# Patient Record
Sex: FEMALE | Race: WHITE | Employment: STUDENT | URBAN - METROPOLITAN AREA
[De-identification: names, ages, dates, MRNs, and addresses within clinical notes are randomized per-mention and may not be internally consistent; named-entity substitution may affect disease eponyms.]

---
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
## 2024-12-10 ENCOUNTER — HOSPITAL ENCOUNTER (EMERGENCY)
Facility: CLINIC | Age: 19
Discharge: HOME | End: 2024-12-10
Attending: PHYSICIAN ASSISTANT | Admitting: PHYSICIAN ASSISTANT
Payer: COMMERCIAL

## 2024-12-10 VITALS
TEMPERATURE: 97.5 F | WEIGHT: 215 LBS | OXYGEN SATURATION: 96 % | BODY MASS INDEX: 36.7 KG/M2 | SYSTOLIC BLOOD PRESSURE: 137 MMHG | HEIGHT: 64 IN | RESPIRATION RATE: 16 BRPM | DIASTOLIC BLOOD PRESSURE: 85 MMHG | HEART RATE: 66 BPM

## 2024-12-10 DIAGNOSIS — Z32.01 PREGNANCY TEST POSITIVE: ICD-10-CM

## 2024-12-10 DIAGNOSIS — L05.01 PILONIDAL ABSCESS: ICD-10-CM

## 2024-12-10 LAB — HCG UR QL: POSITIVE

## 2024-12-10 PROCEDURE — 250N000009 HC RX 250: Performed by: PHYSICIAN ASSISTANT

## 2024-12-10 PROCEDURE — 81025 URINE PREGNANCY TEST: CPT | Performed by: PHYSICIAN ASSISTANT

## 2024-12-10 PROCEDURE — 81025 URINE PREGNANCY TEST: CPT | Performed by: EMERGENCY MEDICINE

## 2024-12-10 PROCEDURE — 99283 EMERGENCY DEPT VISIT LOW MDM: CPT | Mod: 25

## 2024-12-10 PROCEDURE — 10080 I&D PILONIDAL CYST SIMPLE: CPT

## 2024-12-10 RX ADMIN — LIDOCAINE HYDROCHLORIDE 5 ML: 10 INJECTION, SOLUTION EPIDURAL; INFILTRATION; INTRACAUDAL; PERINEURAL at 16:02

## 2024-12-10 RX ADMIN — Medication: at 16:02

## 2024-12-10 ASSESSMENT — ACTIVITIES OF DAILY LIVING (ADL)
ADLS_ACUITY_SCORE: 41

## 2024-12-10 ASSESSMENT — COLUMBIA-SUICIDE SEVERITY RATING SCALE - C-SSRS
1. IN THE PAST MONTH, HAVE YOU WISHED YOU WERE DEAD OR WISHED YOU COULD GO TO SLEEP AND NOT WAKE UP?: NO
2. HAVE YOU ACTUALLY HAD ANY THOUGHTS OF KILLING YOURSELF IN THE PAST MONTH?: NO
6. HAVE YOU EVER DONE ANYTHING, STARTED TO DO ANYTHING, OR PREPARED TO DO ANYTHING TO END YOUR LIFE?: NO

## 2024-12-10 NOTE — ED PROVIDER NOTES
Emergency Department Note      History of Present Illness     Chief Complaint   Alejandra Grigsby is a 19 year old female who presents to the ED for evaluation of coccygeal pain as well as abdominal cramping.  Patient states she has a history of pilonidal cysts and believes she has recurrence at this time.  She notes pain started a few weeks ago, however spontaneously improved about a week ago but has since then returned.  She has not had this current episode intervened upon.  She states she was supposed to have surgery on this in the past however has not gone forward with that to date.  Patient additionally states that she had a positive pregnancy test 2 days ago at home.  Last menses was mid October. She is having some abdominal cramping.  No vaginal bleeding.  No fevers or chills.  No issues with stooling.    Independent Historian   None    Review of External Notes   Gen surg visit on 3/20/24 for pilonidal cyst  A/P: Pilonidal cyst with recurrent abscesses. We discussed the disease and the natural course. We discussed how the theory is that the hair in the gluteal cleft curls around and acts as a drill on the skin and subcutaneous tissues (not an in-grown hair) and creates the cystic cavity which then can become infected. It is not too uncommon to find wads of hair in the cavity. We discussed the surgical treatment with marsupialization. We discussed the risks, benefits and alternatives to the procedure. Pt understood the discussion and asked good questions. We talked about the procedure and the wound care after with wound packing and sits baths BID. We also discussed the prolonged wound healing, wound healing can take up to 4 weeks to 4 months, and we talked about the pain after surgery. I stressed to the patient and her mother who was with her the importance of wound care afterwards. They have to be aggressive with wound care packing at least twice a day and after bowel movements if possible. It  "is the wound care after surgery that dictates the success of the surgery. Answered patients questions. Pt is interested in the surgery.     Because of the inflammation going on at this time, would recommend waiting until the infection has cleared and patient was back to her original baseline level of health and then can schedule surgery, doing this can mean a smaller wound when it comes to the wound care. Stressed the hair removal piece of wound healing and prevention of recurrence.     Past Medical History     Medical History and Problem List   No past medical history on file.    Medications   ALTAVERA 0.15-30 MG-MCG tablet  ondansetron (ZOFRAN-ODT) 4 MG ODT tab  tretinoin (RETIN-A) 0.1 % external cream        Surgical History   No past surgical history on file.    Physical Exam     Patient Vitals for the past 24 hrs:   BP Temp Pulse Resp SpO2 Height Weight   12/10/24 1136 (!) 147/73 -- -- -- -- -- --   12/10/24 1133 -- 97.5  F (36.4  C) 66 16 97 % 1.626 m (5' 4\") 97.5 kg (215 lb)     Physical Exam  Constitutional: Pleasant. Cooperative.   Eyes: Pupils equally round and reactive  HENT: Head is normal in appearance. Oropharynx is normal with moist mucus membranes.  Cardiovascular: Regular rate and rhythm and without murmurs.  Respiratory: Normal respiratory effort, lungs are clear bilaterally.  GI: Abdomen is soft, non-tender, non-distended. No guarding, rebound, or rigidity.  Musculoskeletal: No asymmetry of the lower extremities  Skin: Normal, without rash.  Neurologic: Cranial nerves grossly intact, normal cognition, no focal deficits. Alert and oriented x 3.   Psychiatric: Normal affect.  Nursing notes and vital signs reviewed.      Diagnostics     Lab Results   Labs Ordered and Resulted from Time of ED Arrival to Time of ED Departure   HCG QUALITATIVE URINE - Abnormal       Result Value    hCG Urine Qualitative Positive (*)        Imaging   No orders to display     Independent Interpretation   None    ED Course "      Medications Administered   Medications   lidocaine 1 % 5 mL (has no administration in time range)   lido-EPINEPHrine-tetracaine (LET) topical gel GEL ( Topical $Given by Other 12/10/24 9538)       Procedures   [facility]    PROCEDURE: -Incision/Drainage    Date/Time: 12/10/2024 4:02 PM    Performed by: Paul Campbell PA-C  Authorized by: Paul Campbell PA-C    Risks, benefits and alternatives discussed.      LOCATION:      Type:  Pilonidal cyst    PROCEDURE TYPE:     Complexity:  Simple    ANESTHESIA (see MAR for exact dosages):     Anesthesia method:  Topical application and local infiltration    Topical anesthetic:  LET    Local anesthetic:  Lidocaine 1% w/o epi    PROCEDURE DETAILS:     Incision types:  Single straight    Scalpel blade:  11    Wound management:  Probed and deloculated    Drainage:  Purulent    Drainage amount:  Copious    Wound treatment:  Wound left open    Packing materials:  None    PROCEDURE    Patient Tolerance:  Patient tolerated the procedure well with no immediate complications       Discussion of Management   None    ED Course        Additional Documentation  None    Medical Decision Making / Diagnosis     CMS Diagnoses: None    MIPS       None    MDM   Gary Grigsby is a 19 year old female who presents to the ED for evaluation of coccydynia.  She also notes that she had a positive pregnancy test 2 days ago.  She does have some mild abdominal cramping, however no vaginal bleeding.  See HPI as above for additional details.  Vitals and physical exam as above.  Patient has evidence for pilonidal cyst/abscess.  I&D performed as above with improvement of symptoms and copious output.  No complications, wound was bandaged.  Advise that she follow-up with general surgery as she has previously seen general surgery and was advised to have this removed however did not go forward with this.  She can have that discussion at that time with a general surgeon.  Patient also  states that she had a positive pregnancy test recently with some abdominal cramping.  OB ultrasound ordered, however ultimately patient declines ultrasound stating that it was taking too long.  No vaginal bleeding.  Discussed with patient to have very low threshold to return to the ED with any worsening abdominal pain, should she start to have vaginal bleeding, or with any concerns over her pilonidal cyst. No surrounding cellulitis to suggest for need for oral abx.  Do feel patient safe for discharge to home. Discussed reasons to return. All questions answered. Patient discharged to home in stable condition.    Disposition   The patient was discharged.     Diagnosis     ICD-10-CM    1. Pilonidal abscess  L05.01       2. Pregnancy test positive  Z32.01            Discharge Medications   New Prescriptions    No medications on file       This record was created at least in part using electronic voice recognition software, so please excuse any typographical errors.         Paul Campbell PA-C  12/10/24 [id number]

## 2024-12-10 NOTE — ED TRIAGE NOTES
Pt presents for complaint of pilonidal cyst that started yesterday. Pt states hx of same for which she has followed up with surgery. Pt states common reoccurrence. Additionally pt states concern for pregnancy. Believes she is 6 - 8 weeks along and had a positive home pregnancy test. ABC Intact. A&Ox4.     Triage Assessment (Adult)       Row Name 12/10/24 1133          Triage Assessment    Airway WDL WDL        Respiratory WDL    Respiratory WDL WDL        Skin Circulation/Temperature WDL    Skin Circulation/Temperature WDL WDL        Cardiac WDL    Cardiac WDL WDL        Peripheral/Neurovascular WDL    Peripheral Neurovascular WDL WDL        Cognitive/Neuro/Behavioral WDL    Cognitive/Neuro/Behavioral WDL WDL

## 2024-12-10 NOTE — DISCHARGE INSTRUCTIONS
Keep wound area clean. You may wear a bandage while the wound is draining. Follow up with general surgery to discuss permanent solution. Use Tylenol 1000mg every 8 hours for pain. No ibuprofen use in pregnancy.    You have elected to defer on any further work up with respect to abdominal cramping in pregnancy. Return with severe worsening or vaginal bleeding.

## 2025-03-07 ENCOUNTER — HOSPITAL ENCOUNTER (OUTPATIENT)
Facility: CLINIC | Age: 20
End: 2025-03-07
Admitting: OBSTETRICS & GYNECOLOGY
Payer: COMMERCIAL

## 2025-03-07 ENCOUNTER — HOSPITAL ENCOUNTER (OUTPATIENT)
Facility: CLINIC | Age: 20
Discharge: HOME | End: 2025-03-07
Attending: OBSTETRICS & GYNECOLOGY | Admitting: OBSTETRICS & GYNECOLOGY
Payer: COMMERCIAL

## 2025-03-07 ENCOUNTER — HOSPITAL ENCOUNTER (EMERGENCY)
Facility: CLINIC | Age: 20
Discharge: HOME | End: 2025-03-07
Attending: EMERGENCY MEDICINE | Admitting: EMERGENCY MEDICINE
Payer: COMMERCIAL

## 2025-03-07 ENCOUNTER — APPOINTMENT (OUTPATIENT)
Dept: ULTRASOUND IMAGING | Facility: CLINIC | Age: 20
End: 2025-03-07
Attending: OBSTETRICS & GYNECOLOGY
Payer: COMMERCIAL

## 2025-03-07 VITALS
RESPIRATION RATE: 18 BRPM | HEIGHT: 64 IN | BODY MASS INDEX: 36.02 KG/M2 | TEMPERATURE: 97.8 F | DIASTOLIC BLOOD PRESSURE: 57 MMHG | SYSTOLIC BLOOD PRESSURE: 120 MMHG | WEIGHT: 211 LBS | OXYGEN SATURATION: 99 %

## 2025-03-07 VITALS
OXYGEN SATURATION: 100 % | RESPIRATION RATE: 20 BRPM | DIASTOLIC BLOOD PRESSURE: 74 MMHG | SYSTOLIC BLOOD PRESSURE: 123 MMHG | TEMPERATURE: 97.9 F | HEART RATE: 77 BPM

## 2025-03-07 DIAGNOSIS — O46.90 VAGINAL BLEEDING IN PREGNANCY: ICD-10-CM

## 2025-03-07 LAB
APTT PPP: 29 SECONDS (ref 22–38)
ERYTHROCYTE [DISTWIDTH] IN BLOOD BY AUTOMATED COUNT: 12.3 % (ref 10–15)
FIBRINOGEN PPP-MCNC: 419 MG/DL (ref 170–510)
HCT VFR BLD AUTO: 34.5 % (ref 35–47)
HGB BLD-MCNC: 11.6 G/DL (ref 11.7–15.7)
INR PPP: 1.06 (ref 0.85–1.15)
MCH RBC QN AUTO: 29.3 PG (ref 26.5–33)
MCHC RBC AUTO-ENTMCNC: 33.6 G/DL (ref 31.5–36.5)
MCV RBC AUTO: 87 FL (ref 78–100)
PLATELET # BLD AUTO: 260 10E3/UL (ref 150–450)
RBC # BLD AUTO: 3.96 10E6/UL (ref 3.8–5.2)
WBC # BLD AUTO: 12.5 10E3/UL (ref 4–11)

## 2025-03-07 PROCEDURE — G0463 HOSPITAL OUTPT CLINIC VISIT: HCPCS

## 2025-03-07 PROCEDURE — 85027 COMPLETE CBC AUTOMATED: CPT | Performed by: OBSTETRICS & GYNECOLOGY

## 2025-03-07 PROCEDURE — 85730 THROMBOPLASTIN TIME PARTIAL: CPT | Performed by: OBSTETRICS & GYNECOLOGY

## 2025-03-07 PROCEDURE — 85610 PROTHROMBIN TIME: CPT | Performed by: OBSTETRICS & GYNECOLOGY

## 2025-03-07 PROCEDURE — 36415 COLL VENOUS BLD VENIPUNCTURE: CPT | Performed by: OBSTETRICS & GYNECOLOGY

## 2025-03-07 PROCEDURE — 99281 EMR DPT VST MAYX REQ PHY/QHP: CPT

## 2025-03-07 PROCEDURE — 76805 OB US >/= 14 WKS SNGL FETUS: CPT

## 2025-03-07 PROCEDURE — 85384 FIBRINOGEN ACTIVITY: CPT | Performed by: OBSTETRICS & GYNECOLOGY

## 2025-03-07 RX ORDER — ASPIRIN 81 MG/1
81 TABLET, CHEWABLE ORAL DAILY
COMMUNITY

## 2025-03-07 ASSESSMENT — COLUMBIA-SUICIDE SEVERITY RATING SCALE - C-SSRS
6. HAVE YOU EVER DONE ANYTHING, STARTED TO DO ANYTHING, OR PREPARED TO DO ANYTHING TO END YOUR LIFE?: NO
1. IN THE PAST MONTH, HAVE YOU WISHED YOU WERE DEAD OR WISHED YOU COULD GO TO SLEEP AND NOT WAKE UP?: NO
2. HAVE YOU ACTUALLY HAD ANY THOUGHTS OF KILLING YOURSELF IN THE PAST MONTH?: NO

## 2025-03-07 ASSESSMENT — ACTIVITIES OF DAILY LIVING (ADL)
ADLS_ACUITY_SCORE: 41
ADLS_ACUITY_SCORE: 41
ADLS_ACUITY_SCORE: 18

## 2025-03-07 NOTE — ED PROVIDER NOTES
Emergency Department Note      History of Present Illness     Chief Complaint   Vaginal Bleeding - Pregnant      HPI   Gary Grigsby is a 19 year old female  who is 19 weeks and 1 day pregnant who arrives to the emergency department due to vaginal bleeding.  The patient reports that she was at work and wiped after going to the bathroom to urinate when she noticed a small amount of vaginal bleeding.  Denies any dysuria or visible blood in her urine, but states that she has had urinary frequency due to the pregnancy.  She denies any abdominal pain or pelvic cramping.  She denies back pain, nausea or vomiting.  Denies fevers or chills.  Her OB provider Karma Angelo through [facility].    Independent Historian   None    Review of External Notes   I reviewed her OB ultrasound results from 2025    Past Medical History     Medical History and Problem List   None    Medications   ALTAVERA 0.15-30 MG-MCG tablet  ondansetron (ZOFRAN-ODT) 4 MG ODT tab  tretinoin (RETIN-A) 0.1 % external cream        Surgical History   No past surgical history on file.    Physical Exam     Patient Vitals for the past 24 hrs:   BP Temp Temp src Pulse Resp SpO2   25 1458 123/74 97.9  F (36.6  C) Oral 77 20 100 %     Physical Exam  Nursing note and vitals reviewed.  Constitutional:  Appears well-developed and well-nourished.   HENT:   Head:    Atraumatic.   Mouth/Throat:   Oropharynx is clear and moist. No oropharyngeal exudate.   Eyes:    Pupils are equal, round, and reactive to light.   Neck:    Normal range of motion. Neck supple.      No tracheal deviation present. No thyromegaly present.   Cardiovascular:  Normal rate, regular rhythm, no murmur   Pulmonary/Chest: Breath sounds are clear and equal without wheezes or crackles.  Abdominal:   Soft. Bowel sounds are normal. Exhibits no distension and      no mass. There is no tenderness.      There is no rebound and no guarding.   Musculoskeletal:  Exhibits no edema.    Lymphadenopathy:  No cervical adenopathy.   Neurological:   Alert and oriented to person, place, and time.   Skin:    Skin is warm and dry. No rash noted. No pallor.       Diagnostics     Lab Results   Labs Ordered and Resulted from Time of ED Arrival to Time of ED Departure - No data to display    Imaging   No orders to display       Independent Interpretation   None    ED Course      Medications Administered   Medications - No data to display    Procedures   Procedures     Discussion of Management   OB/GYN, Dr. Rothman    ED Course   ED Course as of 25 1550   Fri Mar 07, 2025   1526 I spoke with Dr. Rothman, OB/GYN       Additional Documentation  None    Medical Decision Making / Diagnosis     CMS Diagnoses: None    MIPS       None    MDM   Gary Grigsby is a 19 year old female who is 19 weeks 1 day pregnant who arrives to the emergency department due to a small amount of vaginal bleeding.  She does not have any sign of at this time.  She has some urinary frequency without any dysuria or visible blood in her urine.  I consulted OB/GYN on-call and I spoke with the birthplace charge nurse and she was sent to the birthplace for evaluation of her vaginal bleeding to check for  labor or other concerning problems.  She was not having any sign of GI bleeding or other serious intra-abdominal process.    Disposition   The patient was sent to the [facility] for OB evaluation after I spoke with Dr. Rothman who is the OB/GYN physician on-call, who accepts the patient for transfer to the birthplace.    Diagnosis     ICD-10-CM    1. Vaginal bleeding in pregnancy  O46.90            Discharge Medications   New Prescriptions    No medications on file         MD Joanne Simon Cheri Lee, MD  25 [phone]

## 2025-03-07 NOTE — PROVIDER NOTIFICATION
03/07/25 1715   Provider Notification   Provider Name/Title Dr. Rothman   Method of Notification Phone   Request Evaluate - Remote   Notification Reason Status Update;Lab/Diagnostic Study     Labs and ultrasound reviewed with MD. Patient states when she was at work earlier today she noticed some red/pink-tinged mucous when wiping. Denies active bleeding or further evidence of vaginal bleeding since here. Denies abdominal cramping or other leakage of fluid. Dop tones in 150s. Patient reports some frequency in urination but denies difficulty urinating, pain, or discoloration to urine. No UA ordered. Received order to discharge home and follow up with primary clinic.

## 2025-03-07 NOTE — PROGRESS NOTES
Data: Patient assessed in the [facility] for vaginal bleeding. Cervical exam deferred. Membranes intact. Contractions are not present. See flowsheets for fetal assessment documentation.     Action: Presumed adequate fetal oxygenation documented. Discharge instructions reviewed. Patient instructed to report change in fetal movement, vaginal leaking of fluid or bleeding, abdominal pain, or any concerns related to the pregnancy to provider/clinic.      Response: Orders to discharge home per Dr. Rothman. Patient verbalized understanding of education and agreement with plan. Discharged to home at 1721.              
Patient presents to [facility] at 1526 in stable condition via wheelchair. Pt is , 19w1d GA with reports of vaginal bleeding. Patient states she noticed red/pink-tinged mucous when wiping in bathroom at work earlier in the day. Denies abdominal cramping or leakage of fluid. Reports she is beginning to feel some fetal movement at this point of her pregnancy.    Fetal doppler tones acquired with verbal consent of patient. No active vaginal bleeding noted with assessment. Abdomen soft and not rigid or tender to touch. Orders received from MD for labs and ultrasound. Plan of care reviewed with patient and patient's mother, and all questions answered. Patient agreeable to plan of care.   
Patient transported to US via wheelchair.   
no abdominal pain, no bloating, no constipation, no diarrhea, no nausea and no vomiting.